# Patient Record
Sex: MALE | Race: WHITE | NOT HISPANIC OR LATINO | Employment: STUDENT | URBAN - METROPOLITAN AREA
[De-identification: names, ages, dates, MRNs, and addresses within clinical notes are randomized per-mention and may not be internally consistent; named-entity substitution may affect disease eponyms.]

---
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
## 2022-01-17 ENCOUNTER — OFFICE VISIT (OUTPATIENT)
Dept: FAMILY MEDICINE CLINIC | Facility: CLINIC | Age: 22
End: 2022-01-17
Payer: OTHER GOVERNMENT

## 2022-01-17 VITALS
BODY MASS INDEX: 22.75 KG/M2 | OXYGEN SATURATION: 98 % | HEIGHT: 72 IN | TEMPERATURE: 98 F | WEIGHT: 168 LBS | HEART RATE: 68 BPM | DIASTOLIC BLOOD PRESSURE: 60 MMHG | SYSTOLIC BLOOD PRESSURE: 110 MMHG

## 2022-01-17 DIAGNOSIS — Z00.00 ENCOUNTER FOR ANNUAL PHYSICAL EXAM: Primary | ICD-10-CM

## 2022-01-17 DIAGNOSIS — Z23 NEED FOR INFLUENZA VACCINATION: ICD-10-CM

## 2022-01-17 DIAGNOSIS — Q21.0 VENTRICULAR SEPTAL DEFECT: ICD-10-CM

## 2022-01-17 PROCEDURE — 90471 IMMUNIZATION ADMIN: CPT

## 2022-01-17 PROCEDURE — 1036F TOBACCO NON-USER: CPT | Performed by: FAMILY MEDICINE

## 2022-01-17 PROCEDURE — 90686 IIV4 VACC NO PRSV 0.5 ML IM: CPT

## 2022-01-17 PROCEDURE — 99395 PREV VISIT EST AGE 18-39: CPT | Performed by: FAMILY MEDICINE

## 2022-01-17 NOTE — PATIENT INSTRUCTIONS
Reviewed health history, healthy 25year-old  Since last seen he did go through 6 month training to join the [employer]  He continues with routine exercise  Watches healthy diet, BMI is fine at 22  He did have echocardiogram last January, has 2 very small VS these, not hemodynamically significant, no need for restriction with exercise, no need SBE prophylaxis  Discussed routine safety, self-testicular exam, STD prevention etcetera  We will see him again in 1 to 2 years, sooner if needed  Flu shot was given here today, did receive other vaccines with  back in April of 2021  Can do COVID booster      Immunization History   Administered Date(s) Administered    Adenovirus, unspecified 04/30/2021    COVID-19 PFIZER VACCINE 0 3 ML IM 05/20/2021, 06/10/2021    DTaP 5 2002, 2002, 2002, 08/04/2003, 04/20/2007    H1N1, All Formulations 10/31/2009    HPV Quadrivalent 06/02/2015, 08/03/2015    HPV9 06/16/2016    Hep A, adult 04/30/2021    Hep B, adult 2002, 2002, 02/01/2003    Hepatitis B Vaccine (Recombinant), Cpg Adjuvanted 04/30/2021, 07/08/2021    Hib (PRP-OMP) 2002, 2002, 2002, 05/12/2003    INFLUENZA 10/18/2016, 10/26/2017    IPV 2002, 2002, 08/04/2003, 04/20/2007, 04/27/2021    Influenza Quadrivalent Preservative Free 3 years and older IM 11/13/2014, 10/26/2017    Influenza Quadrivalent, 6-35 Months IM 10/18/2016    Influenza, injectable, quadrivalent, preservative free 0 5 mL 11/06/2018, 10/18/2019, 11/10/2020    Influenza, seasonal, injectable 11/05/2012, 09/30/2013    MMR 05/12/2003, 04/19/2006    Meningococcal Conjugate (MCV4O) 04/27/2021    Meningococcal MCV4P 05/03/2018    Meningococcal, Unknown Serogroups 04/24/2013    Pneumococcal Conjugate PCV 7 2002, 02/01/2003, 05/12/2003    Pneumococcal Polysaccharide PPV23 2002, 02/01/2003, 05/12/2003    Tdap 04/24/2013, 04/27/2021    Varicella 08/04/2003, 04/20/2007

## 2022-01-17 NOTE — PROGRESS NOTES
FAMILY PRACTICE OFFICE VISIT  [facility]  [facility]  [address]  [address] [place], [place], [address]      NAME: Geraldine Bonilla  AGE: 25 y o  SEX: male  : 2000   MRN: [id number]    DATE: 2022  TIME: 9:00 AM    Assessment and Plan     Problem List Items Addressed This Visit     Ventricular septal defect      Other Visit Diagnoses     Encounter for annual physical exam    -  Primary    Need for influenza vaccination        Relevant Orders    influenza vaccine, quadrivalent, 0 5 mL, preservative-free, for adult and pediatric patients 6 mos+ (AFLURIA, FLUARIX, FLULAVAL, FLUZONE) (Completed)          Patient Instructions     Reviewed health history, healthy 81-year-old  Since last seen he did go through 6 month training to join the [employer]  He continues with routine exercise  Watches healthy diet, BMI is fine at 22  He did have echocardiogram last January, has 2 very small VS these, not hemodynamically significant, no need for restriction with exercise, no need SBE prophylaxis  Discussed routine safety, self-testicular exam, STD prevention etcetera  We will see him again in 1 to 2 years, sooner if needed  Flu shot was given here today, did receive other vaccines with  back in 2021  Can do COVID booster      Immunization History   Administered Date(s) Administered    Adenovirus, unspecified 2021    COVID-19 PFIZER VACCINE 0 3 ML IM 2021, 06/10/2021    DTaP 5 2002, 2002, 2002, 2003, 2007    H1N1, All Formulations 10/31/2009    HPV Quadrivalent 2015, 2015    HPV9 2016    Hep A, adult 2021    Hep B, adult 2002, 2002, 2003    Hepatitis B Vaccine (Recombinant), Cpg Adjuvanted 2021, 2021    Hib (PRP-OMP) 2002, 2002, 2002, 2003    INFLUENZA 10/18/2016, 10/26/2017    IPV 2002, 2002, 08/04/2003, 04/20/2007, 04/27/2021    Influenza Quadrivalent Preservative Free 3 years and older IM 11/13/2014, 10/26/2017    Influenza Quadrivalent, 6-35 Months IM 10/18/2016    Influenza, injectable, quadrivalent, preservative free 0 5 mL 11/06/2018, 10/18/2019, 11/10/2020    Influenza, seasonal, injectable 11/05/2012, 09/30/2013    MMR 05/12/2003, 04/19/2006    Meningococcal Conjugate (MCV4O) 04/27/2021    Meningococcal MCV4P 05/03/2018    Meningococcal, Unknown Serogroups 04/24/2013    Pneumococcal Conjugate PCV 7 2002, 02/01/2003, 05/12/2003    Pneumococcal Polysaccharide PPV23 2002, 02/01/2003, 05/12/2003    Tdap 04/24/2013, 04/27/2021    Varicella 08/04/2003, 04/20/2007             Chief Complaint     Chief Complaint   Patient presents with    Physical Exam       History of Present Illness   Oralia Andrade is a 25y o -year-old male who is in today for a routine checkup, I had seen him last January, he did a 6 month training for [employer], continues to exercise daily, feels great  Has had no exertional complaints  Planning on going to [employer] in the fall, was to become a   Plans to work in the meantime      Review of Systems   Review of Systems   Constitutional: Negative for appetite change, fatigue, fever and unexpected weight change  HENT: Negative for sore throat and trouble swallowing  Respiratory: Negative for cough, chest tightness, shortness of breath and wheezing  Cardiovascular: Negative for chest pain, palpitations and leg swelling  Gastrointestinal: Negative for abdominal pain, blood in stool, nausea and vomiting  No acid reflux     No change in bowel   Genitourinary: Negative for dysuria, frequency, genital sores, hematuria, penile discharge, scrotal swelling and testicular pain  Has been sexually active, uses condoms most of the time  No lesion, other symptom/sign STD    We did discuss using condoms all of the time, STD risk  He did do HIV screening with    Musculoskeletal: Negative for arthralgias and back pain  Neurological: Negative for dizziness, seizures, syncope, light-headedness and headaches  Psychiatric/Behavioral: Negative for behavioral problems and confusion  Active Problem List     Patient Active Problem List   Diagnosis    Headache    History of febrile seizure    Ventricular septal defect       Past Medical History:  Reviewed-unchanged    Past Surgical History:  Reviewed    Family History:  Reviewed    Social History:  Reviewed - no smoking history, does not drink alcohol or use drugs  Objective     Vitals:    01/17/22 0810   BP: 110/60   BP Location: Left arm   Patient Position: Sitting   Cuff Size: Large   Pulse: 68   Temp: 98 °F (36 7 °C)   SpO2: 98%   Weight: 76 2 kg (168 lb)   Height: 6' (1 829 m)     Body mass index is 22 78 kg/m²  BP Readings from Last 3 Encounters:   01/17/22 110/60   01/12/21 100/60   03/02/20 116/78 (41 %, Z = -0 23 /  82 %, Z = 0 92)*     *BP percentiles are based on the 2017 AAP Clinical Practice Guideline for boys       Wt Readings from Last 3 Encounters:   01/17/22 76 2 kg (168 lb) (69 %, Z= 0 48)*   01/12/21 70 8 kg (156 lb) (57 %, Z= 0 18)*   03/02/20 67 9 kg (149 lb 9 6 oz) (53 %, Z= 0 08)*     * Growth percentiles are based on [place] (Boys, 2-20 Years) data  Physical Exam  Constitutional:       General: He is not in acute distress  Appearance: Normal appearance  He is well-developed  He is not ill-appearing  HENT:      Right Ear: Tympanic membrane normal       Left Ear: Tympanic membrane normal    Eyes:      General: No scleral icterus  Cardiovascular:      Rate and Rhythm: Normal rate and regular rhythm  Heart sounds: Normal heart sounds  No murmur heard  Pulmonary:      Effort: Pulmonary effort is normal  No respiratory distress  Breath sounds: Normal breath sounds  Abdominal:      Palpations: Abdomen is soft  Tenderness: There is no abdominal tenderness  Genitourinary:     Penis: Normal        Testes: Normal       Comments: No hernia, no lesion, no swollen glands inguinal  Musculoskeletal:      Right lower leg: No edema  Left lower leg: No edema  Lymphadenopathy:      Cervical: No cervical adenopathy  Neurological:      General: No focal deficit present  Mental Status: He is alert and oriented to person, place, and time  Psychiatric:         Mood and Affect: Mood normal          Behavior: Behavior normal          ALLERGIES:  Allergies   Allergen Reactions    Amoxicillin Rash     Reaction Date: 16Apr2012;     Lamotrigine Rash     Reaction Date: 16Apr2012;        Current Medications     No current outpatient medications on file  No current facility-administered medications for this visit              Orders Placed This Encounter   Procedures    influenza vaccine, quadrivalent, 0 5 mL, preservative-free, for adult and pediatric patients 6 mos+ (AFLURIA, FLUARIX, FLULAVAL, FLUZONE)         Nunu Balbuena DO

## 2022-01-27 ENCOUNTER — OFFICE VISIT (OUTPATIENT)
Dept: FAMILY MEDICINE CLINIC | Facility: CLINIC | Age: 22
End: 2022-01-27
Payer: OTHER GOVERNMENT

## 2022-01-27 VITALS
OXYGEN SATURATION: 100 % | DIASTOLIC BLOOD PRESSURE: 60 MMHG | TEMPERATURE: 97.7 F | HEART RATE: 68 BPM | BODY MASS INDEX: 22.48 KG/M2 | HEIGHT: 72 IN | SYSTOLIC BLOOD PRESSURE: 98 MMHG | WEIGHT: 166 LBS

## 2022-01-27 DIAGNOSIS — F41.8 DEPRESSION WITH ANXIETY: Primary | ICD-10-CM

## 2022-01-27 PROCEDURE — 3008F BODY MASS INDEX DOCD: CPT | Performed by: FAMILY MEDICINE

## 2022-01-27 PROCEDURE — 99213 OFFICE O/P EST LOW 20 MIN: CPT | Performed by: FAMILY MEDICINE

## 2022-01-27 NOTE — PATIENT INSTRUCTIONS
See recent physical   Does relate increased depressive symptoms with anxiety since he completed high school, symptoms intensified with basic training in the [employer], is in [employer], does weekend training monthly  PHQ-9 here today is 9  Does have good appetite, is exercising routinely, does have issues with sleep  He will start Sertraline 50 mg 1/2 tablet every morning for 2 weeks then he will use 1 whole tablet every morning  We will see him again in 1 month, call me sooner if needed  Consider talk therapy/counseling, he did speak to  few times in the [employer], did not find it very helpful  He is looking for work, planning to attend local community college in the fall, criminal justice, looking into law enforcement career

## 2022-01-27 NOTE — PROGRESS NOTES
FAMILY PRACTICE OFFICE VISIT  [facility]  [address]  [address], [place], [address]      NAME: Arthur Resendiz  AGE: 25 y o  SEX: male  : 2000   MRN: [id number]    DATE: 2022  TIME: 10:34 AM    Assessment and Plan     Problem List Items Addressed This Visit     None      Visit Diagnoses     Depression with anxiety    -  Primary    Relevant Medications    sertraline (Zoloft) 50 mg tablet          Patient Instructions   See recent physical   Does relate increased depressive symptoms with anxiety since he completed high school, symptoms intensified with basic training in the [employer], is in [employer], does weekend training monthly  PHQ-9 here today is 9  Does have good appetite, is exercising routinely, does have issues with sleep  He will start Sertraline 50 mg 1/2 tablet every morning for 2 weeks then he will use 1 whole tablet every morning  We will see him again in 1 month, call me sooner if needed  Consider talk therapy/counseling, he did speak to  few times in the [employer], did not find it very helpful  He is looking for work, planning to attend local [employer] in the fall, criminal justice, looking into law enforcement career  Chief Complaint     Chief Complaint   Patient presents with    Depression       History of Present Illness   Arthur Resendiz is a 25y o -year-old male who I had seen recently with checkup, is in today to discuss depression along with anxiety  He relates he has been feeling down since graduating high school  He did go through basic training with the [employer], he is currently in the [employer], feels being in [employer] has worsened his symptoms, 60% of the problem he currently does 1 weekend a month, he will be doing that for 6 years  When he was in basic training he did speak to a  a few times, did not find it helpful    Has never been treated for depression previously, did have some learning difficulty/ADHD during school years    He is looking for work, plans to start community college in the fall, wants to go into law enforcement  Relates college will be covered as he is in the [employer]  He has no HI/SI  He does have issues with sleep, uses melatonin  Feels nervous, anxious at times  His appetite is good  He does not drink alcohol, does not use drugs  Nonsmoker  Does exercise routinely  Is not currently in a long-term relationship but does get out with friends  Family is very supportive of though he feels he is somewhat of a disappointment to them      Review of Systems   Review of Systems   Constitutional: Negative for appetite change, fatigue, fever and unexpected weight change  HENT: Negative for sore throat and trouble swallowing  Respiratory: Negative for cough, chest tightness and shortness of breath  Cardiovascular: Negative for chest pain, palpitations and leg swelling  Gastrointestinal: Negative for abdominal pain, blood in stool, nausea and vomiting  No acid reflux     No change in bowel   Genitourinary: Negative for dysuria and hematuria  Neurological: Positive for headaches (Occasional frontal headache once a week or so, relief with 400 mg Advil  Not associated with nausea or vision change  Onset of headaches around 5th grade)  Negative for dizziness, syncope and light-headedness     Psychiatric/Behavioral:        See HPI       Active Problem List     Patient Active Problem List   Diagnosis    Headache    History of febrile seizure    Ventricular septal defect       Past Medical History:  Reviewed    Past Surgical History:  Reviewed    Family History:  Reviewed    Social History:  Reviewed    Objective     Vitals:    01/27/22 0935   BP: 98/60   BP Location: Left arm   Patient Position: Sitting   Cuff Size: Standard   Pulse: 68   Temp: 97 7 °F (36 5 °C)   SpO2: 100%   Weight: 75 3 kg (166 lb)   Height: 6' (1 829 m)     Body mass index is 22 51 kg/m²  BP Readings from Last 3 Encounters:   01/27/22 98/60   01/17/22 110/60   01/12/21 100/60       Wt Readings from Last 3 Encounters:   01/27/22 75 3 kg (166 lb) (66 %, Z= 0 41)*   01/17/22 76 2 kg (168 lb) (69 %, Z= 0 48)*   01/12/21 70 8 kg (156 lb) (57 %, Z= 0 18)*     * Growth percentiles are based on CDC (Boys, 2-20 Years) data  Physical Exam  Constitutional:       General: He is not in acute distress  Appearance: Normal appearance  He is well-developed  He is not ill-appearing  Eyes:      General: No scleral icterus  Cardiovascular:      Rate and Rhythm: Normal rate and regular rhythm  Heart sounds: Normal heart sounds  No murmur heard  Lymphadenopathy:      Cervical: No cervical adenopathy  Neurological:      Mental Status: He is alert and oriented to person, place, and time  Mental status is at baseline  Psychiatric:         Mood and Affect: Mood normal          Behavior: Behavior normal          ALLERGIES:  Allergies   Allergen Reactions    Amoxicillin Rash     Reaction Date: 16Apr2012;     Lamotrigine Rash     Reaction Date: 16Apr2012;        Current Medications     Current Outpatient Medications   Medication Sig Dispense Refill    sertraline (Zoloft) 50 mg tablet Take 1 tablet (50 mg total) by mouth every morning After using 1/2 pill for the 1st 2 weeks 30 tablet 1     No current facility-administered medications for this visit  No orders of the defined types were placed in this encounter          Shamika Mccallum DO

## 2022-02-18 ENCOUNTER — OFFICE VISIT (OUTPATIENT)
Dept: FAMILY MEDICINE CLINIC | Facility: CLINIC | Age: 22
End: 2022-02-18
Payer: OTHER GOVERNMENT

## 2022-02-18 VITALS
TEMPERATURE: 97.6 F | SYSTOLIC BLOOD PRESSURE: 100 MMHG | HEIGHT: 72 IN | DIASTOLIC BLOOD PRESSURE: 60 MMHG | OXYGEN SATURATION: 99 % | RESPIRATION RATE: 12 BRPM | HEART RATE: 74 BPM | BODY MASS INDEX: 22.16 KG/M2 | WEIGHT: 163.6 LBS

## 2022-02-18 DIAGNOSIS — F41.8 DEPRESSION WITH ANXIETY: ICD-10-CM

## 2022-02-18 DIAGNOSIS — Z20.2 EXPOSURE TO STD: Primary | ICD-10-CM

## 2022-02-18 PROCEDURE — 3008F BODY MASS INDEX DOCD: CPT | Performed by: FAMILY MEDICINE

## 2022-02-18 PROCEDURE — 99212 OFFICE O/P EST SF 10 MIN: CPT | Performed by: FAMILY MEDICINE

## 2022-02-18 NOTE — PATIENT INSTRUCTIONS
He did have possible STD exposure 3 weeks ago, does use condoms routinely, we discussed the importance of using condoms each and every time  He has no symptoms  He is requesting HSV testing, we discussed pitfalls involving blood work, wishes to proceed  He will do GC/chlamydia testing await results  Call us if develops symptoms  Declines HIV testing  He is doing well with sertraline 50 mg daily, he will continue with that as is, call for refill when needed  He can cancel later this month, recheck in 3 to 4 months
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
## 2022-02-23 DIAGNOSIS — F41.8 DEPRESSION WITH ANXIETY: ICD-10-CM

## 2022-03-10 DIAGNOSIS — F41.8 DEPRESSION WITH ANXIETY: ICD-10-CM

## 2022-03-24 ENCOUNTER — OFFICE VISIT (OUTPATIENT)
Dept: FAMILY MEDICINE CLINIC | Facility: CLINIC | Age: 22
End: 2022-03-24
Payer: OTHER GOVERNMENT

## 2022-03-24 VITALS
OXYGEN SATURATION: 98 % | HEIGHT: 73 IN | HEART RATE: 66 BPM | DIASTOLIC BLOOD PRESSURE: 60 MMHG | TEMPERATURE: 97.7 F | WEIGHT: 166 LBS | BODY MASS INDEX: 22 KG/M2 | SYSTOLIC BLOOD PRESSURE: 100 MMHG

## 2022-03-24 DIAGNOSIS — F41.8 DEPRESSION WITH ANXIETY: Primary | ICD-10-CM

## 2022-03-24 PROCEDURE — 99212 OFFICE O/P EST SF 10 MIN: CPT | Performed by: FAMILY MEDICINE

## 2022-03-24 PROCEDURE — 1036F TOBACCO NON-USER: CPT | Performed by: FAMILY MEDICINE

## 2022-03-24 PROCEDURE — 3008F BODY MASS INDEX DOCD: CPT | Performed by: FAMILY MEDICINE

## 2022-03-24 RX ORDER — SERTRALINE HYDROCHLORIDE 100 MG/1
100 TABLET, FILM COATED ORAL EVERY MORNING
Qty: 30 TABLET | Refills: 3 | Status: SHIPPED | OUTPATIENT
Start: 2022-03-24 | End: 2022-04-15

## 2022-03-24 NOTE — PATIENT INSTRUCTIONS
See previous visits, he stopped his sertraline 50 mg few days ago, relates it stopped working  Previously had partial response  In retrospect he attributes partial response to time off from [facility]  As in prior notes he is in [employer] for 6 years, did spend 1 week in [place] recently  He will continue with routine exercise, proper sleep, does not drink alcohol  He will restart sertraline, I sent in 100 mg tablet, use 1/2 tablet for 3 days then increase to 100 mg every morning  Update me again in 3 weeks, if not responding we will switch to other SSRI/other med  Await update, schedule re-evaluation pending that      Also consider talk therapy

## 2022-03-24 NOTE — PROGRESS NOTES
FAMILY PRACTICE OFFICE VISIT  Serene Spatz A Matta D O  [facility]  [address]  [address], [address]      NAME: Irma Klein  AGE: 25 y o  SEX: male  : 2000   MRN: [id number]    DATE: 3/24/2022  TIME: 12:38 PM    Assessment and Plan     Problem List Items Addressed This Visit     Depression with anxiety - Primary    Relevant Medications    sertraline (ZOLOFT) 100 mg tablet          Patient Instructions   See previous visits, he stopped his sertraline 50 mg few days ago, relates it stopped working  Previously had partial response  In retrospect he attributes partial response to time off from [employer] duty  As in prior notes he is in [employer] for 6 years, did spend 1 week in [place] recently  He will continue with routine exercise, proper sleep, does not drink alcohol  He will restart sertraline, I sent in 100 mg tablet, use 1/2 tablet for 3 days then increase to 100 mg every morning  Update me again in 3 weeks, if not responding we will switch to other SSRI/other med  Await update, schedule re-evaluation pending that  Also consider talk therapy      Chief Complaint     Chief Complaint   Patient presents with    Medication Problem       History of Present Illness   Irma Klein is a 25y o -year-old male who is in for a re-evaluation, back in January we had started sertraline 50 mg daily, at follow-up he had related this was working/helping, he now relates he feels it stopped working any actually stopped it 2 days ago  He does note significant stress related to being in the [employer], is in the [employer], was in [place] for 1 week recently, feels that is 80% of the problem  He is working part-time, plans to attend community college in the fall  No HI/SI      He did use doxycycline for 7 days, that to care of previous issue as in prior notes    PHQ-9 back in January was 9      Review of Systems   Review of Systems Constitutional: Negative for appetite change, fatigue, fever and unexpected weight change  He otherwise feels well, exercises every day without issue   HENT: Negative for sore throat and trouble swallowing  Respiratory: Negative for cough, chest tightness and shortness of breath  Cardiovascular: Negative for chest pain, palpitations and leg swelling  Gastrointestinal: Negative for abdominal pain, blood in stool, nausea and vomiting  No acid reflux     No change in bowel   Genitourinary: Negative for dysuria and hematuria  Neurological: Negative for dizziness, syncope, light-headedness and headaches  Psychiatric/Behavioral: Negative for behavioral problems and confusion  Active Problem List     Patient Active Problem List   Diagnosis    Headache    History of febrile seizure    Ventricular septal defect    Depression with anxiety       Past Medical History:  Reviewed    Past Surgical History:  Reviewed    Family History:  Reviewed    Social History:  Reviewed    Objective     Vitals:    03/24/22 0925   BP: 100/60   BP Location: Left arm   Patient Position: Sitting   Cuff Size: Standard   Pulse: 66   Temp: 97 7 °F (36 5 °C)   SpO2: 98%   Weight: 75 3 kg (166 lb)   Height: 6' 1" (1 854 m)     Body mass index is 21 9 kg/m²  BP Readings from Last 3 Encounters:   03/24/22 100/60   02/18/22 100/60   01/27/22 98/60       Wt Readings from Last 3 Encounters:   03/24/22 75 3 kg (166 lb) (65 %, Z= 0 39)*   02/18/22 74 2 kg (163 lb 9 6 oz) (62 %, Z= 0 32)*   01/27/22 75 3 kg (166 lb) (66 %, Z= 0 41)*     * Growth percentiles are based on CDC (Boys, 2-20 Years) data  Physical Exam  Constitutional:       Comments: 25year-old seated no acute distress, good eye contact, well-kept           ALLERGIES:  Allergies   Allergen Reactions    Amoxicillin Rash     Reaction Date: 16Apr2012;     Lamotrigine Rash     Reaction Date: 16Apr2012;        Current Medications     Current Outpatient Medications   Medication Sig Dispense Refill    sertraline (ZOLOFT) 100 mg tablet Take 1 tablet (100 mg total) by mouth every morning 30 tablet 3     No current facility-administered medications for this visit  No orders of the defined types were placed in this encounter          Gerilyn Hammans, DO

## 2022-04-15 DIAGNOSIS — F41.8 DEPRESSION WITH ANXIETY: ICD-10-CM

## 2022-04-15 RX ORDER — SERTRALINE HYDROCHLORIDE 100 MG/1
TABLET, FILM COATED ORAL
Qty: 90 TABLET | Refills: 2 | Status: SHIPPED | OUTPATIENT
Start: 2022-04-15 | End: 2022-05-02 | Stop reason: ALTCHOICE

## 2022-05-02 ENCOUNTER — OFFICE VISIT (OUTPATIENT)
Dept: FAMILY MEDICINE CLINIC | Facility: CLINIC | Age: 22
End: 2022-05-02
Payer: OTHER GOVERNMENT

## 2022-05-02 VITALS
RESPIRATION RATE: 18 BRPM | HEIGHT: 73 IN | SYSTOLIC BLOOD PRESSURE: 100 MMHG | BODY MASS INDEX: 21.52 KG/M2 | OXYGEN SATURATION: 98 % | DIASTOLIC BLOOD PRESSURE: 62 MMHG | WEIGHT: 162.4 LBS | TEMPERATURE: 97.9 F | HEART RATE: 86 BPM

## 2022-05-02 DIAGNOSIS — U07.1 COVID-19 VIRUS INFECTION: ICD-10-CM

## 2022-05-02 DIAGNOSIS — F41.8 DEPRESSION WITH ANXIETY: Primary | ICD-10-CM

## 2022-05-02 LAB
SARS-COV-2 AG UPPER RESP QL IA: POSITIVE
VALID CONTROL: ABNORMAL

## 2022-05-02 PROCEDURE — 99213 OFFICE O/P EST LOW 20 MIN: CPT | Performed by: FAMILY MEDICINE

## 2022-05-02 PROCEDURE — 87811 SARS-COV-2 COVID19 W/OPTIC: CPT | Performed by: FAMILY MEDICINE

## 2022-05-02 RX ORDER — ESCITALOPRAM OXALATE 10 MG/1
10 TABLET ORAL EVERY MORNING
Qty: 90 TABLET | Refills: 3 | Status: SHIPPED | OUTPATIENT
Start: 2022-05-02

## 2022-05-02 NOTE — LETTER
May 2, 2022     Patient: Azra Zavaleta  YOB: 2000  Date of Visit: 5/2/2022      To Whom it May Concern:    Emely Toribio is under my professional care  Severa Prose was seen in my office on 5/2/2022 with a positive covid test   He was out of work starting Friday April 29th, he can return to work on May 9, 2022  If you have any questions or concerns, please don't hesitate to call  Sincerely,          Adela Graves DO        CC: Jyothi Ojeda

## 2022-05-02 NOTE — PATIENT INSTRUCTIONS
He did use sertraline 100 mg daily for 1 month, had noted no improvement regarding depressive symptoms with anxiety, he stopped it a few days ago  He will try Lexapro/escitalopram 10 mg 1/2 tablet every morning for 1 week then he will use 1 whole tablet every morning, update me again in 3 to 4 weeks  He is sleeping okay, is working full-time, continues to exercise routinely  As before being in the [employer] is a life stressor  He also had flu like symptoms starting April 28th, was out of work starting April 29th, COVID test run here today is positive - did receive vaccine x2 in the past   We did discuss his symptoms are improving but he is still infectious, he will notify his family along with others he has been around the last 3 to 4 days  He will remain out of work, can return to work on Monday May 9th

## 2022-05-02 NOTE — PROGRESS NOTES
FAMILY PRACTICE OFFICE VISIT  Karen WILDER O  [facility]  [address]  [address], [place], [address]      NAME: Janet Vasquez  AGE: 23 y o  SEX: male  : 2000   MRN: [id number]    DATE: 2022  TIME: 3:19 PM    Assessment and Plan     Problem List Items Addressed This Visit     Depression with anxiety - Primary    Relevant Medications    escitalopram (Lexapro) 10 mg tablet      Other Visit Diagnoses     COVID-19 virus infection        Relevant Orders    POCT Rapid Covid Ag (Completed)          Patient Instructions   He did use sertraline 100 mg daily for 1 month, had noted no improvement regarding depressive symptoms with anxiety, he stopped it a few days ago  He will try Lexapro/escitalopram 10 mg 1/2 tablet every morning for 1 week then he will use 1 whole tablet every morning, update me again in 3 to 4 weeks  He is sleeping okay, is working full-time, continues to exercise routinely  As before being in the [employer] is a life stressor  He also had flu like symptoms starting , was out of work starting , COVID test run here today is positive - did receive vaccine x2 in the past   We did discuss his symptoms are improving but he is still infectious, he will notify his family along with others he has been around the last 3 to 4 days  He will remain out of work, can return to work on Monday May 9th  Chief Complaint     Chief Complaint   Patient presents with    Follow-up    Influenza     Thursday fever, cough, congestion, tired, chills, bodyaches       History of Present Illness   Janet Vasquez is a 23y o -year-old male who is in today for a re-evaluation, he did start sertraline 100 mg over 1 month ago, he relates he noted no difference in stopped it few days ago  Still feels depressed with anxiety but is working full-time, is exercising routinely, relates is sleeping okay      Incidentally started with fever, chills, flu-like symptoms April 28th, has been out of work since April 29th  No fever today, has cough but is much improved  Family also sick at home      Review of Systems   Review of Systems   Constitutional:        See HPI, has no wheezing, no new stomach,  complaints  Active Problem List     Patient Active Problem List   Diagnosis    Headache    History of febrile seizure    Ventricular septal defect    Depression with anxiety       Past Medical History:  Reviewed    Past Surgical History:  Reviewed    Family History:  Reviewed    Social History:  Reviewed    Objective     Vitals:    05/02/22 1445   BP: 100/62   BP Location: Left arm   Patient Position: Sitting   Cuff Size: Large   Pulse: 86   Resp: 18   Temp: 97 9 °F (36 6 °C)   TempSrc: Temporal   SpO2: 98%   Weight: 73 7 kg (162 lb 6 4 oz)   Height: 6' 1" (1 854 m)     Body mass index is 21 43 kg/m²  BP Readings from Last 3 Encounters:   05/02/22 100/62   03/24/22 100/60   02/18/22 100/60       Wt Readings from Last 3 Encounters:   05/02/22 73 7 kg (162 lb 6 4 oz)   03/24/22 75 3 kg (166 lb) (65 %, Z= 0 39)*   02/18/22 74 2 kg (163 lb 9 6 oz) (62 %, Z= 0 32)*     * Growth percentiles are based on CDC (Boys, 2-20 Years) data  Physical Exam  Constitutional:       General: He is not in acute distress  Appearance: Normal appearance  He is not ill-appearing  Comments: Well-kept, good eye contact, appropriate responses  Cardiovascular:      Rate and Rhythm: Normal rate and regular rhythm  Heart sounds: Normal heart sounds  No murmur heard  Pulmonary:      Effort: Pulmonary effort is normal  No respiratory distress  Breath sounds: Normal breath sounds  Lymphadenopathy:      Cervical: No cervical adenopathy  Neurological:      Mental Status: He is alert  Mental status is at baseline     Psychiatric:         Behavior: Behavior normal          ALLERGIES:  Allergies   Allergen Reactions    Amoxicillin Rash Reaction Date: 16Apr2012;     Lamotrigine Rash     Reaction Date: 16Apr2012;        Current Medications     Current Outpatient Medications   Medication Sig Dispense Refill    escitalopram (Lexapro) 10 mg tablet Take 1 tablet (10 mg total) by mouth every morning 90 tablet 3     No current facility-administered medications for this visit              Orders Placed This Encounter   Procedures    POCT Rapid Covid Ag         [place],

## 2022-09-14 ENCOUNTER — OFFICE VISIT (OUTPATIENT)
Dept: FAMILY MEDICINE CLINIC | Facility: CLINIC | Age: 22
End: 2022-09-14
Payer: OTHER GOVERNMENT

## 2022-09-14 VITALS
WEIGHT: 178.6 LBS | TEMPERATURE: 99.9 F | BODY MASS INDEX: 23.67 KG/M2 | RESPIRATION RATE: 12 BRPM | HEIGHT: 73 IN | OXYGEN SATURATION: 98 % | SYSTOLIC BLOOD PRESSURE: 100 MMHG | DIASTOLIC BLOOD PRESSURE: 61 MMHG | HEART RATE: 100 BPM

## 2022-09-14 DIAGNOSIS — J02.9 SORE THROAT: Primary | ICD-10-CM

## 2022-09-14 LAB
S PYO AG THROAT QL: NEGATIVE
SARS-COV-2 AG UPPER RESP QL IA: NEGATIVE
VALID CONTROL: NORMAL

## 2022-09-14 PROCEDURE — 87811 SARS-COV-2 COVID19 W/OPTIC: CPT | Performed by: FAMILY MEDICINE

## 2022-09-14 PROCEDURE — 87880 STREP A ASSAY W/OPTIC: CPT | Performed by: FAMILY MEDICINE

## 2022-09-14 PROCEDURE — 99214 OFFICE O/P EST MOD 30 MIN: CPT | Performed by: FAMILY MEDICINE

## 2022-09-14 RX ORDER — PREDNISONE 20 MG/1
40 TABLET ORAL DAILY
Qty: 10 TABLET | Refills: 0 | Status: SHIPPED | OUTPATIENT
Start: 2022-09-14

## 2022-09-14 RX ORDER — AZITHROMYCIN 250 MG/1
TABLET, FILM COATED ORAL
Qty: 6 TABLET | Refills: 0 | Status: SHIPPED | OUTPATIENT
Start: 2022-09-14 | End: 2022-09-18

## 2022-09-14 NOTE — PATIENT INSTRUCTIONS
1  Sore throat  -     POCT Rapid Covid Ag  -     POCT rapid strepA  -     predniSONE 20 mg tablet; Take 2 tablets (40 mg total) by mouth daily  -     azithromycin (ZITHROMAX) 250 mg tablet; Take 2 tablets today then 1 tablet daily x 4 days    2  Cough  -     POCT Rapid Covid Ag     Take medication as prescribed, if worsening or severe seek further medical care right away

## 2022-09-14 NOTE — PROGRESS NOTES
Assessment/Plan:       Problem List Items Addressed This Visit    None     Visit Diagnoses     Sore throat    -  Primary    Relevant Medications    predniSONE 20 mg tablet    azithromycin (ZITHROMAX) 250 mg tablet    Other Relevant Orders    POCT Rapid Covid Ag (Completed)    POCT rapid strepA          1  Sore throat  Given significant exudate will treat with course of antibiotics, if no improvement consider mono testing, vs imaging     - POCT Rapid Covid Ag  - POCT rapid strepA  - predniSONE 20 mg tablet; Take 2 tablets (40 mg total) by mouth daily  Dispense: 10 tablet; Refill: 0  - azithromycin (ZITHROMAX) 250 mg tablet; Take 2 tablets today then 1 tablet daily x 4 days  Dispense: 6 tablet; Refill: 0    Subjective:      Patient ID: Purnima Estrella is a 23 y o  male  HPI     23year old male presenting for one week of sore throat, he reports pain with swallowing  He has noted chills at home  Nothing makes the pain better  He can tolerate liquids, pain with solid foods  Rapid COVID negative  Rapid Strep negative  The following portions of the patient's history were reviewed and updated as appropriate: allergies, current medications, past family history, past medical history, past social history, past surgical history and problem list       Current Outpatient Medications:     azithromycin (ZITHROMAX) 250 mg tablet, Take 2 tablets today then 1 tablet daily x 4 days, Disp: 6 tablet, Rfl: 0    escitalopram (Lexapro) 10 mg tablet, Take 1 tablet (10 mg total) by mouth every morning, Disp: 90 tablet, Rfl: 3    predniSONE 20 mg tablet, Take 2 tablets (40 mg total) by mouth daily, Disp: 10 tablet, Rfl: 0     Review of Systems   Constitutional: Negative for activity change and appetite change  HENT: Positive for sore throat and trouble swallowing  Negative for congestion, drooling and voice change  Respiratory: Negative for apnea and chest tightness      Cardiovascular: Negative for chest pain and leg swelling  Gastrointestinal: Negative for abdominal distention and abdominal pain  Musculoskeletal: Negative for arthralgias and back pain  Objective:      /61 (BP Location: Left arm, Patient Position: Sitting, Cuff Size: Standard)   Pulse 100   Temp 99 9 °F (37 7 °C) (Tympanic)   Resp 12   Ht 6' 1" (1 854 m)   Wt 81 kg (178 lb 9 6 oz)   SpO2 98%   BMI 23 56 kg/m²          Physical Exam  Constitutional:       Appearance: Normal appearance  HENT:      Mouth/Throat:      Pharynx: Uvula midline  Oropharyngeal exudate present  No uvula swelling  Tonsils: No tonsillar exudate or tonsillar abscesses  2+ on the right  1+ on the left  Comments: tonsilar exudate on left side, normal speaking voice, no trismus  Cardiovascular:      Rate and Rhythm: Normal rate and regular rhythm  Pulses: Normal pulses  Heart sounds: Normal heart sounds  Pulmonary:      Effort: Pulmonary effort is normal       Breath sounds: Normal breath sounds  Abdominal:      General: Abdomen is flat  Tenderness: There is no abdominal tenderness  Lymphadenopathy:      Cervical: Cervical adenopathy present  Skin:     Capillary Refill: Capillary refill takes less than 2 seconds  Neurological:      General: No focal deficit present  Mental Status: He is alert and oriented to person, place, and time             Eda Charles MD

## 2024-04-04 ENCOUNTER — OFFICE VISIT (OUTPATIENT)
Dept: FAMILY MEDICINE CLINIC | Facility: CLINIC | Age: 24
End: 2024-04-04
Payer: OTHER GOVERNMENT

## 2024-04-04 VITALS
SYSTOLIC BLOOD PRESSURE: 120 MMHG | HEART RATE: 84 BPM | RESPIRATION RATE: 18 BRPM | OXYGEN SATURATION: 98 % | DIASTOLIC BLOOD PRESSURE: 70 MMHG | TEMPERATURE: 97.7 F

## 2024-04-04 DIAGNOSIS — F41.8 DEPRESSION WITH ANXIETY: Primary | ICD-10-CM

## 2024-04-04 DIAGNOSIS — L25.9 SKIN IRRITATION FROM SHAVING: ICD-10-CM

## 2024-04-04 PROCEDURE — 99213 OFFICE O/P EST LOW 20 MIN: CPT | Performed by: FAMILY MEDICINE

## 2024-04-04 NOTE — PATIENT INSTRUCTIONS
1. Depression with anxiety  -     sertraline (ZOLOFT) 50 mg tablet; Take 1 tablet (50 mg total) by mouth daily    2. Skin irritation from shaving  -     Ambulatory Referral to Dermatology; Future

## 2024-04-04 NOTE — PROGRESS NOTES
Assessment/Plan:       Problem List Items Addressed This Visit          Behavioral Health    Depression with anxiety - Primary    Relevant Medications    sertraline (ZOLOFT) 50 mg tablet     Other Visit Diagnoses       Skin irritation from shaving        Relevant Orders    Ambulatory Referral to Dermatology            1. Depression with anxiety  Doing well patient had restarted himself of zoloft at suggestion of his therapist, script refilled, follow up 2 months  - sertraline (ZOLOFT) 50 mg tablet; Take 1 tablet (50 mg total) by mouth daily  Dispense: 30 tablet; Refill: 5    2. Skin irritation from shaving  Has dermatology appointment in 2 hours so no changes made today.    - Ambulatory Referral to Dermatology; Future    Subjective:      Patient ID: Chriss Vasquez is a 23 y.o. male.    HPI    23 year old male presenting for depression follow up and skin irriation.    Doing well overall in therapy, therapist recommended restarting sertaline and patient has been on this, reports side effects with lexapro making him feel off.    The following portions of the patient's history were reviewed and updated as appropriate: allergies, current medications, past family history, past medical history, past social history, past surgical history and problem list.      Current Outpatient Medications:     sertraline (ZOLOFT) 50 mg tablet, Take 1 tablet (50 mg total) by mouth daily, Disp: 30 tablet, Rfl: 5     Review of Systems   Constitutional:  Negative for activity change and appetite change.   Respiratory:  Negative for apnea and chest tightness.    Cardiovascular:  Negative for chest pain and palpitations.   Gastrointestinal:  Negative for abdominal distention and abdominal pain.   Musculoskeletal:  Negative for arthralgias and back pain.         Objective:      /70 (BP Location: Right arm, Cuff Size: Large)   Pulse 84   Temp 97.7 °F (36.5 °C) (Tympanic)   Resp 18   SpO2 98%          Physical Exam  Constitutional:        Appearance: Normal appearance.   Cardiovascular:      Rate and Rhythm: Normal rate and regular rhythm.      Pulses: Normal pulses.      Heart sounds: Normal heart sounds.   Pulmonary:      Effort: Pulmonary effort is normal.      Breath sounds: Normal breath sounds.   Skin:     General: Skin is warm.      Comments: Mild acne   Neurological:      General: No focal deficit present.      Mental Status: He is alert.           Mark Martin, MDDepression Screening Follow-up Plan: Patient's depression screening was positive with a PHQ-2 score of . Their PHQ-9 score was 5. Continue regular follow-up with their psychologist/therapist/psychiatrist who is managing their mental health condition(s).

## 2024-04-28 DIAGNOSIS — F41.8 DEPRESSION WITH ANXIETY: ICD-10-CM

## 2024-05-16 ENCOUNTER — TELEPHONE (OUTPATIENT)
Dept: FAMILY MEDICINE CLINIC | Facility: CLINIC | Age: 24
End: 2024-05-16

## 2024-07-22 ENCOUNTER — OFFICE VISIT (OUTPATIENT)
Dept: FAMILY MEDICINE CLINIC | Facility: CLINIC | Age: 24
End: 2024-07-22
Payer: OTHER GOVERNMENT

## 2024-07-22 VITALS
BODY MASS INDEX: 27.04 KG/M2 | HEIGHT: 73 IN | WEIGHT: 204 LBS | DIASTOLIC BLOOD PRESSURE: 60 MMHG | TEMPERATURE: 97.2 F | SYSTOLIC BLOOD PRESSURE: 110 MMHG | HEART RATE: 68 BPM | OXYGEN SATURATION: 98 %

## 2024-07-22 DIAGNOSIS — M54.2 NECK DISCOMFORT: Primary | ICD-10-CM

## 2024-07-22 PROCEDURE — 99213 OFFICE O/P EST LOW 20 MIN: CPT | Performed by: PHYSICIAN ASSISTANT

## 2024-07-22 RX ORDER — NAPROXEN 500 MG/1
500 TABLET ORAL 2 TIMES DAILY PRN
Qty: 60 TABLET | Refills: 0 | Status: SHIPPED | OUTPATIENT
Start: 2024-07-22

## 2024-07-22 NOTE — ASSESSMENT & PLAN NOTE
Likely muscle strain. Reviewed that there is no palpable abnormality. Area of pain is over lower cervical spinous process. Try naproxen twice daily with food as needed. Encouraged him to do neck stretches several times daily. Consider x-ray if not improving over the next few weeks. Declined PT referral today.

## 2024-07-22 NOTE — PROGRESS NOTES
"  FAMILY PRACTICE ACUTE OFFICE VISIT  [facility] - [facility]       NAME: Chriss Vasquez  AGE: 24 y.o. SEX: male       : 2000        MRN: [id number]    DATE: 2024  TIME: 1:49 PM    Assessment and Plan     Problem List Items Addressed This Visit          Neurology/Sleep    Neck discomfort - Primary     Likely muscle strain. Reviewed that there is no palpable abnormality. Area of pain is over lower cervical spinous process. Try naproxen twice daily with food as needed. Encouraged him to do neck stretches several times daily. Consider x-ray if not improving over the next few weeks. Declined PT referral today.         Relevant Medications    naproxen (Naprosyn) 500 mg tablet           Chief Complaint     Chief Complaint   Patient presents with    Mass     \"Painful lump on base of neck.\"       History of Present Illness   Chriss Vasquez is a 24 y.o.-year-old male who presents for neck pain.     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 Negative for dizziness.       Active Problem List     Patient Active Problem List   Diagnosis    Headache    History of febrile seizure    Ventricular septal defect    Depression with anxiety    Neck discomfort         Social History:  Social History     Socioeconomic History    Marital status: Single     Spouse name: Not on file    Number of children: Not on file    Years of education: Not on file    Highest education level: Not on file   Occupational History    Not on " "file   Tobacco Use    Smoking status: Never    Smokeless tobacco: Never    Tobacco comments:     no secondhand smoke exposure    Vaping Use    Vaping status: Never Used   Substance and Sexual Activity    Alcohol use: No    Drug use: No    Sexual activity: Not Currently   Other Topics Concern    Not on file   Social History Narrative    Not on file     Social Determinants of Health     Financial Resource Strain: Not on file   Food Insecurity: Not on file   Transportation Needs: Not on file   Physical Activity: Not on file   Stress: Not on file   Social Connections: Not on file   Intimate Partner Violence: Not on file   Housing Stability: Not on file       Objective     Vitals:    07/22/24 1300   BP: 110/60   BP Location: Left arm   Cuff Size: Large   Pulse: 68   Temp: (!) 97.2 °F (36.2 °C)   TempSrc: Tympanic   SpO2: 98%   Weight: 92.5 kg (204 lb)   Height: 6' 1\" (1.854 m)     Wt Readings from Last 3 Encounters:   07/22/24 92.5 kg (204 lb)   09/14/22 81 kg (178 lb 9.6 oz)   05/02/22 73.7 kg (162 lb 6.4 oz)       Physical Exam  Constitutional:       General: He is not in acute distress.     Appearance: Normal appearance. He is not ill-appearing.   Pulmonary:      Effort: Pulmonary effort is normal. No respiratory distress.   Musculoskeletal:      Cervical back: Normal range of motion and neck supple. No tenderness.   Neurological:      Mental Status: He is alert.   Psychiatric:         Mood and Affect: Mood normal.         Behavior: Behavior normal.         Thought Content: Thought content normal.         Judgment: Judgment normal.             ALLERGIES:  Allergies   Allergen Reactions    Amoxicillin Rash     Reaction Date: 16Apr2012;     Lamotrigine Rash     Reaction Date: 16Apr2012;        Current Medications     Current Outpatient Medications   Medication Sig Dispense Refill    naproxen (Naprosyn) 500 mg tablet Take 1 tablet (500 mg total) by mouth 2 (two) times a day as needed for moderate pain (Take with food.) " 60 tablet 0    sertraline (ZOLOFT) 50 mg tablet TAKE 1 TABLET BY MOUTH EVERY DAY (Patient not taking: Reported on 7/22/2024) 90 tablet 2     No current facility-administered medications for this visit.         Alyson Jimenez PA-C  7/22/2024 1:49 PM  [facility]

## 2024-09-03 ENCOUNTER — TELEPHONE (OUTPATIENT)
Dept: FAMILY MEDICINE CLINIC | Facility: CLINIC | Age: 24
End: 2024-09-03

## 2024-09-03 NOTE — TELEPHONE ENCOUNTER
LVM for pt to call back to reschedule his 9-6-24 appointment at 10:20 AM due to Dr. Oviedo being on vacation.

## 2025-05-02 ENCOUNTER — TELEPHONE (OUTPATIENT)
Dept: FAMILY MEDICINE CLINIC | Facility: CLINIC | Age: 25
End: 2025-05-02